# Patient Record
Sex: FEMALE | Race: ASIAN | Employment: FULL TIME | URBAN - METROPOLITAN AREA
[De-identification: names, ages, dates, MRNs, and addresses within clinical notes are randomized per-mention and may not be internally consistent; named-entity substitution may affect disease eponyms.]

---

## 2021-10-06 ENCOUNTER — HOSPITAL ENCOUNTER (EMERGENCY)
Facility: HOSPITAL | Age: 26
Discharge: HOME | End: 2021-10-06
Attending: FAMILY MEDICINE | Admitting: FAMILY MEDICINE
Payer: COMMERCIAL

## 2021-10-06 ENCOUNTER — APPOINTMENT (OUTPATIENT)
Dept: ULTRASOUND IMAGING | Facility: HOSPITAL | Age: 26
End: 2021-10-06
Attending: FAMILY MEDICINE
Payer: COMMERCIAL

## 2021-10-06 VITALS
HEART RATE: 97 BPM | TEMPERATURE: 97.8 F | WEIGHT: 200 LBS | RESPIRATION RATE: 18 BRPM | BODY MASS INDEX: 36.8 KG/M2 | OXYGEN SATURATION: 97 % | DIASTOLIC BLOOD PRESSURE: 81 MMHG | SYSTOLIC BLOOD PRESSURE: 127 MMHG | HEIGHT: 62 IN

## 2021-10-06 DIAGNOSIS — O20.0 THREATENED MISCARRIAGE IN EARLY PREGNANCY: ICD-10-CM

## 2021-10-06 LAB
ABO/RH(D): NORMAL
ANTIBODY SCREEN: NEGATIVE
ERYTHROCYTE [DISTWIDTH] IN BLOOD BY AUTOMATED COUNT: 15 % (ref 10–15)
HCG SERPL-ACNC: 67 MLU/ML (ref 0–4)
HCT VFR BLD AUTO: 43.6 % (ref 35–47)
HGB BLD-MCNC: 13.4 G/DL (ref 11.7–15.7)
MCH RBC QN AUTO: 25.8 PG (ref 26.5–33)
MCHC RBC AUTO-ENTMCNC: 30.7 G/DL (ref 31.5–36.5)
MCV RBC AUTO: 84 FL (ref 78–100)
PLATELET # BLD AUTO: 100 10E3/UL (ref 150–450)
RBC # BLD AUTO: 5.19 10E6/UL (ref 3.8–5.2)
SPECIMEN EXPIRATION DATE: NORMAL
WBC # BLD AUTO: 9.2 10E3/UL (ref 4–11)

## 2021-10-06 PROCEDURE — 85027 COMPLETE CBC AUTOMATED: CPT | Performed by: FAMILY MEDICINE

## 2021-10-06 PROCEDURE — 99284 EMERGENCY DEPT VISIT MOD MDM: CPT | Mod: 25

## 2021-10-06 PROCEDURE — 76801 OB US < 14 WKS SINGLE FETUS: CPT

## 2021-10-06 PROCEDURE — 36415 COLL VENOUS BLD VENIPUNCTURE: CPT | Performed by: FAMILY MEDICINE

## 2021-10-06 PROCEDURE — 84702 CHORIONIC GONADOTROPIN TEST: CPT | Performed by: FAMILY MEDICINE

## 2021-10-06 PROCEDURE — 86900 BLOOD TYPING SEROLOGIC ABO: CPT | Performed by: FAMILY MEDICINE

## 2021-10-06 ASSESSMENT — MIFFLIN-ST. JEOR: SCORE: 1595.44

## 2021-10-06 NOTE — Clinical Note
Elizabet Farah was seen and treated in our emergency department on 10/6/2021.  She may return to work on 10/11/2021.       If you have any questions or concerns, please don't hesitate to call.      Zechariah Medina MD

## 2021-10-07 NOTE — DISCHARGE INSTRUCTIONS
Your beta hCG, the blood test for pregnancy, is low at 67.  This may mean that you are very early in your pregnancy, you have had a miscarriage.  This will need to be rechecked in 2 days.  Your ultrasound does not show a pregnancy in the uterus, there is a cyst on the ovary.  This can be further evaluated with your OB/GYN doctor.

## 2021-10-07 NOTE — ED TRIAGE NOTES
Pt states she had a positive pregnancy test 5-6 days ago. . Pt states she began bleeding 2 days. Pt states she is switching pad every 3 hours. Denies pain. LMP 21.

## 2021-10-07 NOTE — ED PROVIDER NOTES
EMERGENCY DEPARTMENT ENCOUNTER      NAME: Elizabet SMITH Her  AGE: 26 year old female  YOB: 1995  MRN: [id number]  EVALUATION DATE & TIME: 10/6/2021  9:50 PM    PCP: No Ref-Primary, Physician    ED PROVIDER: Zechariah Medina M.D.    Chief Complaint   Patient presents with     Vaginal Bleeding     Pregnancy Complications       FINAL IMPRESSION:  1. Threatened miscarriage in early pregnancy        ED COURSE & MEDICAL DECISION MAKING:    Pertinent Labs & Imaging studies personally reviewed and interpreted by me. (See chart for details)  10:05 PM Patient seen and examined, prior records reviewed. PPE worn throughout all patient encounters; surgical mask, gloves.  Differential diagnosis includes but not limited to threatened miscarriage, dysfunctional uterine bleeding, first trimester vaginal bleeding, subchorionic hemorrhage.  Patient with early pregnancy, positive pregnancy test couple days ago but a negative pregnancy test couple days prior to that, now with bleeding.  Ultrasound done today demonstrates a likely corpus luteum cyst but no intrauterine pregnancy.  Beta-hCG today pending.  10:48 PM beta-hCG is 67 consistent with very early pregnancy.  Given ultrasound findings, likely this represents miscarriage.  Remaining labs are pending.    We discussed the plan for discharge and the patient is agreeable. Reviewed supportive cares, symptomatic treatment, outpatient follow up, and reasons to return to the Emergency Department. Patient to be discharged by ED RN.        At the conclusion of the encounter I discussed the results of all of the tests and the disposition. The questions were answered. The patient or family acknowledged understanding and was agreeable with the care plan.     PROCEDURES:   Procedures    MEDICATIONS GIVEN IN THE EMERGENCY:  Medications - No data to display    NEW PRESCRIPTIONS STARTED AT TODAY'S ER VISIT  There are no discharge medications for this  patient.      =================================================================    HPI    Patient information was obtained from: patient      Elizabet Farah is a 26 year old female -0-1-4 at 6+5 weeks by LMP of  who presents with vaginal bleeding.  She started having vaginal bleeding today.  She denies any pain or cramping, she is changing a pad about every 3 hours and says this bleeding is less than a normal period for her.  She took a pregnancy test about a week ago that was negative but then took another 1 about 5 days ago that was positive.  Bleeding is less than a period, she is changing a pad about every 3 hours.  Denies any chest pain or lightheadedness.  No dyspnea.    REVIEW OF SYSTEMS   Review of Systems   Respiratory:        Negative for dyspnea   Genitourinary: Positive for vaginal bleeding.   All other systems reviewed and are negative.     All other systems reviewed and negative    PAST MEDICAL HISTORY:  No past medical history on file.    PAST SURGICAL HISTORY:  No past surgical history on file.    CURRENT MEDICATIONS:    No current facility-administered medications for this encounter.     No current outpatient medications on file.       ALLERGIES:  No Known Allergies    FAMILY HISTORY:  No family history on file.    SOCIAL HISTORY:   Social History     Socioeconomic History     Marital status: Single     Spouse name: Not on file     Number of children: Not on file     Years of education: Not on file     Highest education level: Not on file   Occupational History     Not on file   Tobacco Use     Smoking status: Not on file   Substance and Sexual Activity     Alcohol use: Not on file     Drug use: Not on file     Sexual activity: Not on file   Other Topics Concern     Not on file   Social History Narrative     Not on file     Social Determinants of Health     Financial Resource Strain:      Difficulty of Paying Living Expenses:    Food Insecurity:      Worried About Running Out of Food in  "the Last Year:      Ran Out of Food in the Last Year:    Transportation Needs:      Lack of Transportation (Medical):      Lack of Transportation (Non-Medical):    Physical Activity:      Days of Exercise per Week:      Minutes of Exercise per Session:    Stress:      Feeling of Stress :    Social Connections:      Frequency of Communication with Friends and Family:      Frequency of Social Gatherings with Friends and Family:      Attends Sabianism Services:      Active Member of Clubs or Organizations:      Attends Club or Organization Meetings:      Marital Status:    Intimate Partner Violence:      Fear of Current or Ex-Partner:      Emotionally Abused:      Physically Abused:      Sexually Abused:        VITALS:  /81   Pulse 97   Temp 97.8  F (36.6  C) (Temporal)   Resp 18   Ht 1.575 m (5' 2\")   Wt 90.7 kg (200 lb)   LMP 08/20/2021   SpO2 97%   BMI 36.58 kg/m      PHYSICAL EXAM:  Physical Exam  Vitals and nursing note reviewed.   Constitutional:       Appearance: Normal appearance.   HENT:      Head: Normocephalic and atraumatic.      Right Ear: External ear normal.      Left Ear: External ear normal.      Nose: Nose normal.      Mouth/Throat:      Mouth: Mucous membranes are moist.   Eyes:      Extraocular Movements: Extraocular movements intact.      Conjunctiva/sclera: Conjunctivae normal.      Pupils: Pupils are equal, round, and reactive to light.   Cardiovascular:      Rate and Rhythm: Regular rhythm. Tachycardia present.   Pulmonary:      Effort: Pulmonary effort is normal.      Breath sounds: Normal breath sounds. No wheezing or rales.   Abdominal:      General: Abdomen is flat. There is no distension.      Palpations: Abdomen is soft.      Tenderness: There is no abdominal tenderness. There is no guarding.   Musculoskeletal:         General: Normal range of motion.      Cervical back: Normal range of motion and neck supple.      Right lower leg: No edema.      Left lower leg: No edema. "   Lymphadenopathy:      Cervical: No cervical adenopathy.   Skin:     General: Skin is warm and dry.   Neurological:      General: No focal deficit present.      Mental Status: She is alert and oriented to person, place, and time. Mental status is at baseline.      Comments: No gross focal neurologic deficits   Psychiatric:         Mood and Affect: Mood normal.         Behavior: Behavior normal.         Thought Content: Thought content normal.          LAB:  All pertinent labs reviewed and interpreted.  Results for orders placed or performed during the hospital encounter of 10/06/21   OB  US 1st trimester w transvag    Impression    IMPRESSION:   1.  No intrauterine gestation identified. Presumed left adnexal corpus luteum cyst pregnancy. Correlate with serial beta hCGs.       CBC (+ platelets, no diff)   Result Value Ref Range    WBC Count 9.2 4.0 - 11.0 10e3/uL    RBC Count 5.19 3.80 - 5.20 10e6/uL    Hemoglobin 13.4 11.7 - 15.7 g/dL    Hematocrit 43.6 35.0 - 47.0 %    MCV 84 78 - 100 fL    MCH 25.8 (L) 26.5 - 33.0 pg    MCHC 30.7 (L) 31.5 - 36.5 g/dL    RDW 15.0 10.0 - 15.0 %    Platelet Count 100 (L) 150 - 450 10e3/uL   HCG quantitative pregnancy (blood)   Result Value Ref Range    hCG Quantitative 67 (H) 0 - 4 mlU/mL   Adult Type and Screen   Result Value Ref Range    ABO/RH(D) O POS     Antibody Screen Negative Negative    SPECIMEN EXPIRATION DATE [id number]        RADIOLOGY:  Reviewed all pertinent imaging. Please see official radiology report.  OB  US 1st trimester w transvag   Final Result   IMPRESSION:    1.  No intrauterine gestation identified. Presumed left adnexal corpus luteum cyst pregnancy. Correlate with serial beta hCGs.              I, Rina Peter, am serving as a scribe to document services personally performed by Dr. Medina based on my observation and the provider's statements to me. I, Zechariah Medina MD attest that Rina Peter is acting in a scribe capacity, has observed my  performance of the services and has documented them in accordance with my direction.    Zechariah Medina M.D.  Emergency Medicine  [facility] EMERGENCY DEPARTMENT  [facility] [facility] [address] [phone]  Dept: [phone]     Zechariah Medina MD  10/07/21 0018

## 2022-01-30 ENCOUNTER — HOSPITAL ENCOUNTER (EMERGENCY)
Facility: HOSPITAL | Age: 27
Discharge: HOME | End: 2022-01-30
Attending: EMERGENCY MEDICINE | Admitting: EMERGENCY MEDICINE
Payer: COMMERCIAL

## 2022-01-30 ENCOUNTER — APPOINTMENT (OUTPATIENT)
Dept: ULTRASOUND IMAGING | Facility: HOSPITAL | Age: 27
End: 2022-01-30
Attending: EMERGENCY MEDICINE
Payer: COMMERCIAL

## 2022-01-30 VITALS
HEART RATE: 87 BPM | HEIGHT: 62 IN | WEIGHT: 200 LBS | TEMPERATURE: 97.2 F | SYSTOLIC BLOOD PRESSURE: 116 MMHG | BODY MASS INDEX: 36.8 KG/M2 | OXYGEN SATURATION: 100 % | DIASTOLIC BLOOD PRESSURE: 68 MMHG | RESPIRATION RATE: 16 BRPM

## 2022-01-30 DIAGNOSIS — O20.0 THREATENED MISCARRIAGE: ICD-10-CM

## 2022-01-30 DIAGNOSIS — N30.01 ACUTE CYSTITIS WITH HEMATURIA: ICD-10-CM

## 2022-01-30 LAB
ALBUMIN UR-MCNC: 10 MG/DL
APPEARANCE UR: ABNORMAL
BACTERIA #/AREA URNS HPF: ABNORMAL /HPF
BILIRUB UR QL STRIP: NEGATIVE
COLOR UR AUTO: ABNORMAL
GLUCOSE UR STRIP-MCNC: NEGATIVE MG/DL
HCG SERPL-ACNC: ABNORMAL MLU/ML (ref 0–4)
HGB UR QL STRIP: ABNORMAL
KETONES UR STRIP-MCNC: NEGATIVE MG/DL
LEUKOCYTE ESTERASE UR QL STRIP: ABNORMAL
MUCOUS THREADS #/AREA URNS LPF: PRESENT /LPF
NITRATE UR QL: NEGATIVE
PH UR STRIP: 5.5 [PH] (ref 5–7)
RBC URINE: 3 /HPF
SP GR UR STRIP: 1.03 (ref 1–1.03)
SQUAMOUS EPITHELIAL: 17 /HPF
UROBILINOGEN UR STRIP-MCNC: <2 MG/DL
WBC URINE: 52 /HPF

## 2022-01-30 PROCEDURE — 84702 CHORIONIC GONADOTROPIN TEST: CPT | Performed by: EMERGENCY MEDICINE

## 2022-01-30 PROCEDURE — 87086 URINE CULTURE/COLONY COUNT: CPT | Performed by: EMERGENCY MEDICINE

## 2022-01-30 PROCEDURE — 76801 OB US < 14 WKS SINGLE FETUS: CPT

## 2022-01-30 PROCEDURE — 36415 COLL VENOUS BLD VENIPUNCTURE: CPT | Performed by: EMERGENCY MEDICINE

## 2022-01-30 PROCEDURE — 99285 EMERGENCY DEPT VISIT HI MDM: CPT | Mod: 25

## 2022-01-30 PROCEDURE — 250N000013 HC RX MED GY IP 250 OP 250 PS 637: Performed by: EMERGENCY MEDICINE

## 2022-01-30 PROCEDURE — 81001 URINALYSIS AUTO W/SCOPE: CPT | Performed by: EMERGENCY MEDICINE

## 2022-01-30 RX ORDER — CEPHALEXIN 500 MG/1
500 CAPSULE ORAL ONCE
Status: COMPLETED | OUTPATIENT
Start: 2022-01-30 | End: 2022-01-30

## 2022-01-30 RX ORDER — CEPHALEXIN 500 MG/1
500 CAPSULE ORAL 2 TIMES DAILY
Qty: 14 CAPSULE | Refills: 0 | Status: SHIPPED | OUTPATIENT
Start: 2022-01-30 | End: 2022-02-06

## 2022-01-30 RX ADMIN — CEPHALEXIN 500 MG: 500 CAPSULE ORAL at 05:00

## 2022-01-30 ASSESSMENT — ENCOUNTER SYMPTOMS
DYSURIA: 0
ABDOMINAL PAIN: 0

## 2022-01-30 ASSESSMENT — MIFFLIN-ST. JEOR: SCORE: 1595.44

## 2022-01-30 NOTE — ED TRIAGE NOTES
Patient reports seeing blood after wiping when urinating. She states she is pregnant, unknown how many weeks,  and has had miscarriages. Bleeding has stopped. Pain free, slight cramps.

## 2022-01-30 NOTE — DISCHARGE INSTRUCTIONS
Please complete antibiotics.  Please schedule your first OB visit as soon as possible.  If you develop heavy vaginal bleeding soaking 1 pad per hour for 3 consecutive hours or you develop a fever greater than 100.4 degrees despite using the antibiotics, please return to the emergency department.

## 2022-01-30 NOTE — ED PROVIDER NOTES
EMERGENCY DEPARTMENT ENCOUNTER      NAME: Elizabet SMITH Her  AGE: 26 year old female  YOB: 1995  MRN: [id number]  EVALUATION DATE & TIME: 1/30/2022  3:35 AM    PCP: No Ref-Primary, Physician    ED PROVIDER: Tracey Edmond M.D.      Chief Complaint   Patient presents with     Vaginal Bleeding         FINAL IMPRESSION:  1. Acute cystitis with hematuria        MEDICAL DECISION MAKING:    3:39 AM I met with the patient, obtained history, performed an initial exam, and discussed options and plan for diagnostics and treatment here in the ED. I saw the patient wearing an eye shield, surgical mask, gown, and gloves.      Pertinent Labs & Imaging studies reviewed. (See chart for details)         Elizabet Farah is a 26 year old G7, P4 female who presents with spotting.  Last period was the end of December.  She took a positive home pregnancy test 2 days ago.  She has a history of multiple miscarriages and is concerned because she saw some blood when she wiped after urinating.  She has not had a fever or vomiting.  Vital signs show tachycardia and hypertension but she is anxious.  She is having breast tenderness and other symptoms of pregnancy.  She is O+.  I suspect a urinary tract infection rather than miscarriage.  Other possibilities include implantation bleeding.  Without abdominal pain or heavy bleeding, low suspicion for actual miscarriage but I will get a beta hCG and sent for ultrasound to evaluate.    Her urine does show blood, white blood cells, leukocyte esterase and a small amount of bacteria.  In the first trimester will elect to treat this for sure.  Her ultrasound is still pending.  She will be signed out to Dr. Sanders pending the results of her ultrasound.     MEDICATIONS GIVEN IN THE EMERGENCY:  Medications   cephALEXin (KEFLEX) capsule 500 mg (has no administration in time range)       NEW PRESCRIPTIONS STARTED AT TODAY'S ER VISIT:  New Prescriptions    CEPHALEXIN (KEFLEX) 500 MG CAPSULE    Take 1  "capsule (500 mg) by mouth 2 times daily for 7 days          =================================================================    HPI    Patient information was obtained from: Patient     Use of : Use of : N/A       Elizabet SMITH Her is a 26 year old, , female with a limited history who presents with vaginal bleeding.    The patient reports a history of three previous miscarriages and states that today when she was wiping, she noticed a little bit of spotting on her tissue paper. The patient reports taking an at home pregnancy test two days ago which was positive. She endorses breast tenderness. Her LMP is estimated to be towards the end of December. She is otherwise in her usual state of health and denies any pain with urination, abdominal pain, or any other concerns at this time.     REVIEW OF SYSTEMS   Review of Systems   Gastrointestinal: Negative for abdominal pain.   Genitourinary: Positive for vaginal bleeding. Negative for dysuria.   All other systems reviewed and are negative.       PAST MEDICAL HISTORY:  History reviewed. No pertinent past medical history.    PAST SURGICAL HISTORY:  History reviewed. No pertinent surgical history.    CURRENT MEDICATIONS:      Current Facility-Administered Medications:      cephALEXin (KEFLEX) capsule 500 mg, 500 mg, Oral, Once, Tracey Edmond MD    Current Outpatient Medications:      cephALEXin (KEFLEX) 500 MG capsule, Take 1 capsule (500 mg) by mouth 2 times daily for 7 days, Disp: 14 capsule, Rfl: 0    ALLERGIES:  No Known Allergies    FAMILY HISTORY:  No family history on file.    SOCIAL HISTORY:   Social History     Tobacco Use     Smoking status: Never Smoker     Smokeless tobacco: Never Used   Substance Use Topics     Alcohol use: Not Currently     Drug use: Never        PHYSICAL EXAM:    Vitals: BP (!) 161/98   Pulse 118   Temp 97.2  F (36.2  C) (Oral)   Resp 16   Ht 1.575 m (5' 2\")   Wt 90.7 kg (200 lb)   LMP 2021   SpO2 100%  "  BMI 36.58 kg/m     General:. Alert and interactive, comfortable appearing.  HENT: Oropharynx without erythema or exudates. MMM.  TMs clear bilaterally.  Eyes: Pupils mid-sized and equally reactive.   Neck: Full AROM.  No midline tenderness to palpation.  Cardiovascular: Tachycardic rate and regular rhythm. Peripheral pulses 2+ bilaterally.  Chest/Pulmonary: Normal work of breathing. Lung sounds clear and equal throughout, no wheezes or crackles. No chest wall tenderness or deformities.  Abdomen: Soft, nondistended. Nontender without guarding or rebound. Obese. No flank tenderness. Vaginal exam deferred.   Back/Spine: No CVA or midline tenderness.  Extremities: Normal ROM of all major joints. No lower extremity edema.   Skin: Warm and dry. Normal skin color.   Neuro: Speech clear. CNs grossly intact. Moves all extremities appropriately. Strength and sensation grossly intact to all extremities.   Psych: Normal affect/mood, cooperative, memory appropriate. Anxious appearing and tearful.    LAB:  All pertinent labs reviewed and interpreted.  Labs Ordered and Resulted from Time of ED Arrival to Time of ED Departure   ROUTINE UA WITH MICROSCOPIC REFLEX TO CULTURE - Abnormal       Result Value    Color Urine Light Yellow      Appearance Urine Turbid (*)     Glucose Urine Negative      Bilirubin Urine Negative      Ketones Urine Negative      Specific Gravity Urine 1.027      Blood Urine 0.1 mg/dL (*)     pH Urine 5.5      Protein Albumin Urine 10  (*)     Urobilinogen Urine <2.0      Nitrite Urine Negative      Leukocyte Esterase Urine 500 Antonio/uL (*)     Bacteria Urine Few (*)     Mucus Urine Present (*)     RBC Urine 3 (*)     WBC Urine 52 (*)     Squamous Epithelials Urine 17 (*)    HCG QUANTITATIVE PREGNANCY   URINE CULTURE       RADIOLOGY:  OB  US 1st trimester w transvag    (Results Pending)         Gene WILKINSON am serving as a scribe to document services personally performed by Dr. Tracey Edmond  based on  my observation and the provider's statements to me. I, Tracey Edmond MD attest that Gene Crespo is acting in a scribe capacity, has observed my performance of the services and has documented them in accordance with my direction.      Tracey Edmond M.D.  Emergency Medicine  [facility] EMERGENCY DEPARTMENT  [address] [place] [id number]  [phone]  Dept: [phone]         Tracey Edmond MD  01/30/22 0448

## 2022-01-30 NOTE — ED PROVIDER NOTES
"ED SIGNOUT  Date/Time:1/30/2022 4:50 AM    Patient signed out to me by my colleague, Dr. Edmond.  Please see their note for complete history and physical. Plan to follow up on ultrasound imaging and disposition.     The creation of this record is based on the scribe s observations of the work being performed by Yariel Sanders and the provider s statements to them. It was created on their behalf by Nelida Pool, a trained medical scribe. This document has been checked and approved by the attending provider.      REMAINING ED WORKUP:    Vitals:  BP (!) 161/98   Pulse 118   Temp 97.2  F (36.2  C) (Oral)   Resp 16   Ht 1.575 m (5' 2\")   Wt 90.7 kg (200 lb)   LMP 12/20/2021 (Approximate)   SpO2 100%   BMI 36.58 kg/m        Pertinent labs results reviewed   Results for orders placed or performed during the hospital encounter of 01/30/22   OB  US 1st trimester w transvag    Impression    IMPRESSION:   1.  Single live intrauterine pregnancy at 6 weeks 0 days estimated gestational age based upon crown-rump length measurement on the study. The estimated date of delivery is 09/25/2022.  2.  No subchorionic hemorrhage.  3.  Trace amount of nonspecific free fluid in the pelvis.     UA with Microscopic reflex to Culture    Specimen: Urine, Clean Catch   Result Value Ref Range    Color Urine Light Yellow Colorless, Straw, Light Yellow, Yellow    Appearance Urine Turbid (A) Clear    Glucose Urine Negative Negative mg/dL    Bilirubin Urine Negative Negative    Ketones Urine Negative Negative mg/dL    Specific Gravity Urine 1.027 1.001 - 1.030    Blood Urine 0.1 mg/dL (A) Negative    pH Urine 5.5 5.0 - 7.0    Protein Albumin Urine 10  (A) Negative mg/dL    Urobilinogen Urine <2.0 <2.0 mg/dL    Nitrite Urine Negative Negative    Leukocyte Esterase Urine 500 Antonio/uL (A) Negative    Bacteria Urine Few (A) None Seen /HPF    Mucus Urine Present (A) None Seen /LPF    RBC Urine 3 (H) <=2 /HPF    WBC Urine 52 (H) <=5 /HPF    " Squamous Epithelials Urine 17 (H) <=1 /HPF   HCG quantitative pregnancy (blood)   Result Value Ref Range    hCG Quantitative 18,446 (H) 0 - 4 mlU/mL       Pertinent imaging reviewed   Please see official radiology report.  OB  US 1st trimester w transvag   Preliminary Result   IMPRESSION:    1.  Single live intrauterine pregnancy at 6 weeks 0 days estimated gestational age based upon crown-rump length measurement on the study. The estimated date of delivery is 09/25/2022.   2.  No subchorionic hemorrhage.   3.  Trace amount of nonspecific free fluid in the pelvis.            Interventions  Medications   cephALEXin (KEFLEX) capsule 500 mg (500 mg Oral Given 1/30/22 0500)      ED Course/MDM:  4:50 AM Signout accepted from Dr. Edmond.  Prior records were reviewed.  Diagnostics from this visit are reviewed.  6:14 AM I introduced myself to the patient.    Patient signed a pending ultrasound.  Ultrasound showed live IUP at 6 weeks.  No evidence of any subchorionic hemorrhage or other reason for bleeding.  Bleeding possibly related to the urinary tract infection.  Patient already started on antibiotics will plan for outpatient treatment.  Patient I discussed pelvic rest, threatened miscarriage, and close follow-up for repeat hormone level.  Patient understood recommendations and will be discharged at this time.           1. Acute cystitis with hematuria    2. Threatened miscarriage          Dr. Yariel Sanders  01/30/22  [facility]       Yariel Sanders MD  01/30/22 0618

## 2022-01-31 LAB — BACTERIA UR CULT: NORMAL

## 2022-02-09 ENCOUNTER — APPOINTMENT (OUTPATIENT)
Dept: ULTRASOUND IMAGING | Facility: HOSPITAL | Age: 27
End: 2022-02-09
Attending: EMERGENCY MEDICINE
Payer: COMMERCIAL

## 2022-02-09 ENCOUNTER — HOSPITAL ENCOUNTER (EMERGENCY)
Facility: HOSPITAL | Age: 27
Discharge: HOME | End: 2022-02-09
Attending: EMERGENCY MEDICINE | Admitting: EMERGENCY MEDICINE
Payer: COMMERCIAL

## 2022-02-09 VITALS
TEMPERATURE: 98.4 F | HEART RATE: 97 BPM | SYSTOLIC BLOOD PRESSURE: 149 MMHG | RESPIRATION RATE: 16 BRPM | OXYGEN SATURATION: 100 % | DIASTOLIC BLOOD PRESSURE: 89 MMHG

## 2022-02-09 DIAGNOSIS — O21.9 NAUSEA AND VOMITING IN PREGNANCY: ICD-10-CM

## 2022-02-09 LAB
ALBUMIN SERPL-MCNC: 3.8 G/DL (ref 3.5–5)
ALBUMIN UR-MCNC: 30 MG/DL
ALP SERPL-CCNC: 70 U/L (ref 45–120)
ALT SERPL W P-5'-P-CCNC: 40 U/L (ref 0–45)
ANION GAP SERPL CALCULATED.3IONS-SCNC: 11 MMOL/L (ref 5–18)
APPEARANCE UR: ABNORMAL
AST SERPL W P-5'-P-CCNC: 24 U/L (ref 0–40)
BACTERIA #/AREA URNS HPF: ABNORMAL /HPF
BILIRUB DIRECT SERPL-MCNC: 0.2 MG/DL
BILIRUB SERPL-MCNC: 0.6 MG/DL (ref 0–1)
BILIRUB UR QL STRIP: NEGATIVE
BUN SERPL-MCNC: 10 MG/DL (ref 8–22)
CALCIUM SERPL-MCNC: 8.8 MG/DL (ref 8.5–10.5)
CHLORIDE BLD-SCNC: 106 MMOL/L (ref 98–107)
CO2 SERPL-SCNC: 19 MMOL/L (ref 22–31)
COLOR UR AUTO: YELLOW
CREAT SERPL-MCNC: 0.62 MG/DL (ref 0.6–1.1)
ERYTHROCYTE [DISTWIDTH] IN BLOOD BY AUTOMATED COUNT: 14.5 % (ref 10–15)
GFR SERPL CREATININE-BSD FRML MDRD: >90 ML/MIN/1.73M2
GLUCOSE BLD-MCNC: 88 MG/DL (ref 70–125)
GLUCOSE UR STRIP-MCNC: NEGATIVE MG/DL
HCT VFR BLD AUTO: 41.8 % (ref 35–47)
HGB BLD-MCNC: 13.5 G/DL (ref 11.7–15.7)
HGB UR QL STRIP: NEGATIVE
HOLD SPECIMEN: NORMAL
KETONES UR STRIP-MCNC: 20 MG/DL
LEUKOCYTE ESTERASE UR QL STRIP: ABNORMAL
MAGNESIUM SERPL-MCNC: 2 MG/DL (ref 1.8–2.6)
MCH RBC QN AUTO: 26.3 PG (ref 26.5–33)
MCHC RBC AUTO-ENTMCNC: 32.3 G/DL (ref 31.5–36.5)
MCV RBC AUTO: 81 FL (ref 78–100)
MUCOUS THREADS #/AREA URNS LPF: PRESENT /LPF
NITRATE UR QL: NEGATIVE
PH UR STRIP: 6 [PH] (ref 5–7)
PLATELET # BLD AUTO: 264 10E3/UL (ref 150–450)
POTASSIUM BLD-SCNC: 3.9 MMOL/L (ref 3.5–5)
PROT SERPL-MCNC: 7.7 G/DL (ref 6–8)
RBC # BLD AUTO: 5.14 10E6/UL (ref 3.8–5.2)
RBC URINE: 6 /HPF
SODIUM SERPL-SCNC: 136 MMOL/L (ref 136–145)
SP GR UR STRIP: 1.03 (ref 1–1.03)
SQUAMOUS EPITHELIAL: 21 /HPF
UROBILINOGEN UR STRIP-MCNC: <2 MG/DL
WBC # BLD AUTO: 6.6 10E3/UL (ref 4–11)
WBC URINE: 83 /HPF

## 2022-02-09 PROCEDURE — 36415 COLL VENOUS BLD VENIPUNCTURE: CPT | Performed by: EMERGENCY MEDICINE

## 2022-02-09 PROCEDURE — 99284 EMERGENCY DEPT VISIT MOD MDM: CPT | Mod: 25

## 2022-02-09 PROCEDURE — 85027 COMPLETE CBC AUTOMATED: CPT | Performed by: EMERGENCY MEDICINE

## 2022-02-09 PROCEDURE — 76817 TRANSVAGINAL US OBSTETRIC: CPT

## 2022-02-09 PROCEDURE — 83735 ASSAY OF MAGNESIUM: CPT | Performed by: EMERGENCY MEDICINE

## 2022-02-09 PROCEDURE — 96361 HYDRATE IV INFUSION ADD-ON: CPT

## 2022-02-09 PROCEDURE — 87086 URINE CULTURE/COLONY COUNT: CPT | Performed by: EMERGENCY MEDICINE

## 2022-02-09 PROCEDURE — 250N000013 HC RX MED GY IP 250 OP 250 PS 637: Performed by: EMERGENCY MEDICINE

## 2022-02-09 PROCEDURE — 258N000003 HC RX IP 258 OP 636: Performed by: EMERGENCY MEDICINE

## 2022-02-09 PROCEDURE — 82248 BILIRUBIN DIRECT: CPT | Performed by: EMERGENCY MEDICINE

## 2022-02-09 PROCEDURE — 96360 HYDRATION IV INFUSION INIT: CPT

## 2022-02-09 PROCEDURE — 81001 URINALYSIS AUTO W/SCOPE: CPT | Performed by: EMERGENCY MEDICINE

## 2022-02-09 PROCEDURE — 250N000011 HC RX IP 250 OP 636: Performed by: EMERGENCY MEDICINE

## 2022-02-09 RX ORDER — LANOLIN ALCOHOL/MO/W.PET/CERES
50 CREAM (GRAM) TOPICAL 3 TIMES DAILY PRN
Qty: 60 TABLET | Refills: 0 | Status: SHIPPED | OUTPATIENT
Start: 2022-02-09

## 2022-02-09 RX ORDER — METOCLOPRAMIDE 10 MG/1
10 TABLET ORAL 3 TIMES DAILY PRN
Qty: 30 TABLET | Refills: 0 | Status: SHIPPED | OUTPATIENT
Start: 2022-02-09

## 2022-02-09 RX ORDER — METOCLOPRAMIDE 10 MG/1
10 TABLET ORAL ONCE
Status: COMPLETED | OUTPATIENT
Start: 2022-02-09 | End: 2022-02-09

## 2022-02-09 RX ADMIN — SODIUM CHLORIDE, POTASSIUM CHLORIDE, SODIUM LACTATE AND CALCIUM CHLORIDE 1000 ML: 600; 310; 30; 20 INJECTION, SOLUTION INTRAVENOUS at 16:32

## 2022-02-09 RX ADMIN — METOCLOPRAMIDE 10 MG: 10 TABLET ORAL at 16:28

## 2022-02-09 RX ADMIN — PYRIDOXINE HYDROCHLORIDE 50 MG: 100 INJECTION, SOLUTION INTRAMUSCULAR; INTRAVENOUS at 17:37

## 2022-02-09 ASSESSMENT — ENCOUNTER SYMPTOMS
NECK STIFFNESS: 0
ABDOMINAL PAIN: 1
CONFUSION: 0
HEADACHES: 0
SHORTNESS OF BREATH: 0
COLOR CHANGE: 0
ARTHRALGIAS: 0
FEVER: 0
DIFFICULTY URINATING: 0
VOMITING: 1
EYE REDNESS: 0
NAUSEA: 1

## 2022-02-09 NOTE — ED PROVIDER NOTES
ED Provider In Triage Note  [facility]  Encounter Date: 2022    Chief Complaint   Patient presents with     Emesis During Pregnancy       Brief HPI:   Elizabet Farah is a 26 year old female,  approximately 8 weeks gestation (by ultrasound) presenting to the Emergency Department with a chief complaint of nausea and vomiting, unable to tolerate po x 4-5 days. No history of similar. Has been having some abdominal cramping, like hunger pains (not similar to menstrual cramping). No vaginal bleeding. Denies diarrhea, fevers.      Scheduled for first OB appointment .     LMP .    Brief Physical Exam:  BP (!) 149/89   Pulse 97   Temp 98.4  F (36.9  C) (Oral)   Resp 16   LMP 2021 (Approximate)   SpO2 100%   General: Non-toxic appearing  HEENT: Atraumatic  Resp: No respiratory distress; clear lungs  Cardiac: RRR  Abdomen: soft, non-tender  Neuro: Alert, oriented, answers questions appropriately; cranial nerves grossly intact, no focal motor deficits  Psych: Behavior appropriate    Plan Initiated in Triage:  IVF, reglan, labs, ultrasound    PIT Dispo:   Room when able, ok for now in     Alexa Osullivan MD on 2022 at 3:33 PM    Patient was evaluated by the Physician in Triage due to a limitation of available rooms in the Emergency Department. A plan of care was discussed based on the information obtained on the initial evaluation and patient was consuled to return back to the Emergency Department lobby after this initial evalutaiton until results were obtained or a room became available in the Emergency Department. Patient was counseled not to leave prior to receiving the results of their workup.        Alexa Osullivan MD  22 1537

## 2022-02-09 NOTE — ED PROVIDER NOTES
EMERGENCY DEPARTMENT ENCOUNTER     NAME: Elizabet SMITH Her   AGE: 26 year old female   YOB: 1995   MRN: [id number]   EVALUATION DATE & TIME: 2/9/2022  4:14 PM   PCP: No Ref-Primary, Physician     Chief Complaint   Patient presents with     Emesis During Pregnancy   :    FINAL IMPRESSION       1. Nausea and vomiting in pregnancy           ED COURSE & MEDICAL DECISION MAKING      Pertinent Labs & Imaging studies reviewed. (See chart for details)   26 year old female  presents to the Emergency Department for evaluation of nausea and vomiting in early pregnancy. Initial Vitals Reviewed. Initial exam notable for generally well-appearing patient with normal vitals, no vaginal bleeding or concern for something like ectopic.  She has had IUP confirmed by ultrasound but this was repeated today and shows a viable pregnancy at 7 weeks 2 days.  Labs do not show any significant signs of dehydration or electrolyte abnormalities.  She was treated symptomatically with IV fluids as well as Reglan and pyridoxine and is now tolerating oral intake and feeling improved.  She has an appointment to establish care with OB here in the [place] campus on March 1, and in the meantime we will have her start vitamin B6 and Unisom combo with Reglan for breakthrough nausea and she is quite comfortable with this at time of discharge.    Of note, we did do a urinalysis which has a few cells in it but is nitrite negative and contaminated.  I actually compared it to a urinalysis that was just done about a week ago and it looks similar.  I reviewed the urine culture and this did not have any growth and just showed mixed urogenital kaylen.  Therefore, I am not suspicious that this represents a UTI or pyelonephritis and does not appear to be related to her nausea and vomiting.           At the conclusion of the encounter I discussed the results of all of the tests and the disposition. The questions were answered. The patient or family acknowledged  understanding and was agreeable with the care plan.     4:20 PM I attempted my initial assessment however patient is currently not in the room. I will return shortly and try again.  4:28 PM I met with the patient, obtained history, performed an initial exam, and discussed options and plan for diagnostics and treatment here in the ED.   6:15 PM I rechecked the patient and updated her on labs and US. She is feeling improved. We discussed the plan for discharge with OB follow up and the patient is agreeable. Reviewed supportive cares, symptomatic treatment, and reasons to return to the Emergency Department.         MEDICATIONS GIVEN IN THE EMERGENCY:   Medications   lactated ringers BOLUS 1,000 mL (1,000 mLs Intravenous New Bag 22 1632)   metoclopramide (REGLAN) tablet 10 mg (10 mg Oral Given 22 1628)   pyridOXINE (B-6) injection 50 mg (50 mg Intravenous Given 22 1737)      NEW PRESCRIPTIONS STARTED AT TODAY'S ER VISIT   New Prescriptions    METOCLOPRAMIDE (REGLAN) 10 MG TABLET    Take 1 tablet (10 mg) by mouth 3 times daily as needed (nausea)    VITAMIN B6 (PYRIDOXINE) 50 MG TABS    Take 1 tablet (50 mg) by mouth 3 times daily as needed (nausea)     ================================================================   HISTORY OF PRESENT ILLNESS       Patient information was obtained from: patient   Use of Intrepreter: N/A    Elizabet SMITH Her is a 26 year old female , currently 8 weeks pregnant, who presents via private vehicle for evaluation of nausea and vomiting.    Patient endorses persistent nausea and vomiting with abdominal cramping for the last four days. She has a history of this in her previous pregnancies, though notes it has never gotten to the point where she is unable to eat, which prompted her presentation today. She has not taken any medications for this. Denies vaginal bleeding or discharge. Patient is following with [facility] for her prenatal care.      ================================================================    REVIEW OF SYSTEMS       Review of Systems   Constitutional: Negative for fever.   HENT: Negative for congestion.    Eyes: Negative for redness.   Respiratory: Negative for shortness of breath.    Cardiovascular: Negative for chest pain.   Gastrointestinal: Positive for abdominal pain (cramping), nausea and vomiting.   Genitourinary: Negative for difficulty urinating, vaginal bleeding and vaginal discharge.   Musculoskeletal: Negative for arthralgias and neck stiffness.   Skin: Negative for color change.   Neurological: Negative for headaches.   Psychiatric/Behavioral: Negative for confusion.   All other systems reviewed and are negative.        PAST HISTORY     PAST MEDICAL HISTORY:   No past medical history on file.   PAST SURGICAL HISTORY:   No past surgical history on file.   CURRENT MEDICATIONS:   metoclopramide (REGLAN) 10 MG tablet  vitamin B6 (PYRIDOXINE) 50 MG TABS      ALLERGIES:   No Known Allergies   FAMILY HISTORY:   No family history on file.   SOCIAL HISTORY:   Social History     Socioeconomic History     Marital status: Single     Spouse name: Not on file     Number of children: Four     Years of education: Not on file     Highest education level: Not on file   Occupational History     Not on file   Tobacco Use     Smoking status: Never Smoker     Smokeless tobacco: Never Used   Substance and Sexual Activity     Alcohol use: Not Currently     Drug use: Never     Sexual activity: Yes   Other Topics Concern     Not on file   Social History Narrative     Not on file     Social Determinants of Health     Financial Resource Strain: Not on file   Food Insecurity: Not on file   Transportation Needs: Not on file   Physical Activity: Not on file   Stress: Not on file   Social Connections: Not on file   Intimate Partner Violence: Not on file   Housing Stability: Not on file        VITALS  Patient Vitals for the past 24 hrs:   BP Temp Temp  src Pulse Resp SpO2   02/09/22 1535 (!) 149/89 98.4  F (36.9  C) Oral 97 16 100 %        ================================================================    PHYSICAL EXAM     VITAL SIGNS: BP (!) 149/89   Pulse 97   Temp 98.4  F (36.9  C) (Oral)   Resp 16   LMP 12/20/2021 (Approximate)   SpO2 100%    Constitutional:  Awake, no acute distress   HENT:  Atraumatic, oropharynx without exudate or erythema, membranes moist  Lymph:  No adenopathy  Eyes: EOM intact, PERRL, no injection  Neck: Supple  Respiratory:  Clear to auscultation bilaterally, no wheezes or crackles   Cardiovascular:  Regular rate and rhythm, single S1 and S2   GI:  Soft, nontender, nondistended, no rebound or guarding   Musculoskeletal:  Moves all extremities, no lower extremity edema, no deformities    Skin:  Warm, dry  Neurologic:  Alert and oriented x3, no focal deficits noted       ================================================================  LAB       All pertinent labs reviewed and interpreted.   Labs Ordered and Resulted from Time of ED Arrival to Time of ED Departure   CBC WITH PLATELETS - Abnormal       Result Value    WBC Count 6.6      RBC Count 5.14      Hemoglobin 13.5      Hematocrit 41.8      MCV 81      MCH 26.3 (*)     MCHC 32.3      RDW 14.5      Platelet Count 264     BASIC METABOLIC PANEL - Abnormal    Sodium 136      Potassium 3.9      Chloride 106      Carbon Dioxide (CO2) 19 (*)     Anion Gap 11      Urea Nitrogen 10      Creatinine 0.62      Calcium 8.8      Glucose 88      GFR Estimate >90     ROUTINE UA WITH MICROSCOPIC REFLEX TO CULTURE - Abnormal    Color Urine Yellow      Appearance Urine Turbid (*)     Glucose Urine Negative      Bilirubin Urine Negative      Ketones Urine 20  (*)     Specific Gravity Urine 1.034 (*)     Blood Urine Negative      pH Urine 6.0      Protein Albumin Urine 30  (*)     Urobilinogen Urine <2.0      Nitrite Urine Negative      Leukocyte Esterase Urine 500 Antonio/uL (*)     Bacteria Urine  Few (*)     Mucus Urine Present (*)     RBC Urine 6 (*)     WBC Urine 83 (*)     Squamous Epithelials Urine 21 (*)    HEPATIC FUNCTION PANEL - Normal    Bilirubin Total 0.6      Bilirubin Direct 0.2      Protein Total 7.7      Albumin 3.8      Alkaline Phosphatase 70      AST 24      ALT 40     MAGNESIUM - Normal    Magnesium 2.0     URINE CULTURE        ===============================================================  RADIOLOGY       Reviewed all pertinent imaging. Please see official radiology report.   OB transvaginal US   Final Result   IMPRESSION:    1.  Single living intrauterine gestation at 7 weeks 2 days, EDC 09/26/2022.                  ================================================================  EKG         I have independently reviewed and interpreted the EKG(s) documented above.     ================================================================  PROCEDURES         I, Nery Owens, am serving as a scribe to document services personally performed by Dr. Menezes based on my observation and the provider's statements to me. I, Ev Menezes MD attest that Nery Owens is acting in a scribe capacity, has observed my performance of the services and has documented them in accordance with my direction.     Ev Menezes M.D.   Emergency Medicine   [facility] EMERGENCY DEPARTMENT  [facility] [facility] [address]  [phone]  Dept: [phone]       Ev Menezes MD  02/09/22 2009       Ev Menezes MD  02/09/22 9028

## 2022-02-09 NOTE — ED TRIAGE NOTES
The pt reports vomiting for the past four days. She is 8 weeks pregnant, reports cramps but denies bleeding.

## 2022-02-10 LAB — BACTERIA UR CULT: NORMAL

## 2022-02-10 NOTE — DISCHARGE INSTRUCTIONS
Fortunately all of the lab work looks good and the ultrasound today shows 7 weeks 2 days pregnant.  The first thing that can help with medicines for nausea and vomiting is to combine the pyridoxine which was prescribed today with a tablet of Unisom.  This can be bought over-the-counter at the pharmacy and you can take these 2 together 3 times a day.  If that is not working, you can also add one of the metoclopramide tablets prescribed today.